# Patient Record
Sex: FEMALE | Race: BLACK OR AFRICAN AMERICAN
[De-identification: names, ages, dates, MRNs, and addresses within clinical notes are randomized per-mention and may not be internally consistent; named-entity substitution may affect disease eponyms.]

---

## 2016-07-28 VITALS — SYSTOLIC BLOOD PRESSURE: 153 MMHG | DIASTOLIC BLOOD PRESSURE: 93 MMHG

## 2017-02-13 ENCOUNTER — HOSPITAL ENCOUNTER (OUTPATIENT)
Dept: HOSPITAL 61 - LAB | Age: 42
Discharge: HOME | End: 2017-02-13
Attending: INTERNAL MEDICINE
Payer: COMMERCIAL

## 2017-02-13 DIAGNOSIS — J20.9: Primary | ICD-10-CM

## 2017-02-13 LAB — OBC FLU: (no result)

## 2017-02-13 PROCEDURE — 87804 INFLUENZA ASSAY W/OPTIC: CPT

## 2017-04-18 ENCOUNTER — HOSPITAL ENCOUNTER (OUTPATIENT)
Dept: HOSPITAL 61 - SURG | Age: 42
Discharge: HOME | End: 2017-04-18
Attending: INTERNAL MEDICINE
Payer: COMMERCIAL

## 2017-04-18 VITALS — SYSTOLIC BLOOD PRESSURE: 136 MMHG | DIASTOLIC BLOOD PRESSURE: 84 MMHG

## 2017-04-18 DIAGNOSIS — Z98.51: ICD-10-CM

## 2017-04-18 DIAGNOSIS — K22.4: Primary | ICD-10-CM

## 2017-04-18 DIAGNOSIS — D64.9: ICD-10-CM

## 2017-04-18 DIAGNOSIS — K21.9: ICD-10-CM

## 2017-04-18 LAB
NEG OBC UR: (no result)
POS OBC UR: (no result)

## 2017-04-18 PROCEDURE — 43248 EGD GUIDE WIRE INSERTION: CPT

## 2017-04-18 PROCEDURE — 81025 URINE PREGNANCY TEST: CPT

## 2017-04-18 NOTE — PDOC1
HISTORY & PHYSICAL


H&P





                  Outpatient history and physical examination


HPI:  


Patient has been having significant recurrent dysphagia to the point of choking 

sensation.  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  Benefited from dilation 

previously.      


Past medical history:  


Gastroesophageal reflux disease.


Systemic lupus erythematosus


Anemia   


Redding's esophagus not proven during last endoscopy as well as biopsy.  


Past surgical history:  


Tubal ligation


Appendectomy


Family history:  


Negative for any gastrointestinal or any hepatic diseases.    


Personal history:  


Non smoker. No alcohol abuse. No history of any recreational drug usage.     


Allergies:  


NKDA               


Current medications:  


Reviewed       


Review of systems:  


Unremarkable other then what has been described in HPI and past medical and 

surgical history.        


Physical examination:


GENERAL: Patient is alert and oriented.


VS: Stable .


HEENT: No cervical adenopathy. No scleral icterus. 


CHEST: clear to A&P


HEART: s1 and s2 are normal. No murmur.


ABDOMEN: Soft. Not distended. Non tender. No masses or organomegaly. No hernia. 

No ascites. BS present.


CNS: No neurological deficit noted.


LYMPHATICS: No evidence of peripheral lymphadenopathy noted.


Assessments:            


Dysphagia   


Plan:   


EGD and dilation.





         
































;








JESSICA OCHOA MD Jul 28, 2016 11:42








JESSICA OCHOA MD Apr 18, 2017 08:48

## 2017-04-18 NOTE — PDOC4
GI OP Report - Dr. Ochoa


Date/Time


DATE: 4/18/17 


TIME: 08:49


Attending Physician


Anam Ochoa MD


Referring Physician





Indications


Dysphagia


Pre-Op


See the Anesthesia note for documentation of the administered medications


Procedures


Upper GI endoscopy+dilation


Findings


- The esophageal examination was consistent with esophageal spasm.  Dilated. 


- Normal lower third of esophagus. 


- Normal stomach. 


- Normal examined duodenum. 


- No specimens collected.


Plan


- Discharge patient to home. 


- Patient has a contact number available for emergencies.  The signs and 

symptoms of potential delayed complications were discussed with the patient.  

Return to normal activities tomorrow.  Written discharge instructions were 

provided to the patient. 


- Resume regular diet. 


- Continue present medications. 


- Return to my office as needed.








ANAM OCHOA MD Apr 18, 2017 08:58

## 2017-11-30 ENCOUNTER — HOSPITAL ENCOUNTER (INPATIENT)
Dept: HOSPITAL 61 - ER | Age: 42
Discharge: HOME | DRG: 313 | End: 2017-11-30
Attending: INTERNAL MEDICINE | Admitting: INTERNAL MEDICINE
Payer: COMMERCIAL

## 2017-11-30 VITALS — DIASTOLIC BLOOD PRESSURE: 93 MMHG | SYSTOLIC BLOOD PRESSURE: 134 MMHG

## 2017-11-30 VITALS
DIASTOLIC BLOOD PRESSURE: 86 MMHG | DIASTOLIC BLOOD PRESSURE: 86 MMHG | SYSTOLIC BLOOD PRESSURE: 136 MMHG | SYSTOLIC BLOOD PRESSURE: 136 MMHG | SYSTOLIC BLOOD PRESSURE: 136 MMHG | DIASTOLIC BLOOD PRESSURE: 86 MMHG

## 2017-11-30 VITALS — HEIGHT: 62 IN | WEIGHT: 193.4 LBS | BODY MASS INDEX: 35.59 KG/M2

## 2017-11-30 VITALS — SYSTOLIC BLOOD PRESSURE: 145 MMHG | DIASTOLIC BLOOD PRESSURE: 87 MMHG

## 2017-11-30 DIAGNOSIS — Z79.82: ICD-10-CM

## 2017-11-30 DIAGNOSIS — D89.89: ICD-10-CM

## 2017-11-30 DIAGNOSIS — R07.89: Primary | ICD-10-CM

## 2017-11-30 DIAGNOSIS — K22.70: ICD-10-CM

## 2017-11-30 DIAGNOSIS — Z98.51: ICD-10-CM

## 2017-11-30 DIAGNOSIS — Z90.49: ICD-10-CM

## 2017-11-30 DIAGNOSIS — K21.9: ICD-10-CM

## 2017-11-30 DIAGNOSIS — I24.9: ICD-10-CM

## 2017-11-30 DIAGNOSIS — Z82.49: ICD-10-CM

## 2017-11-30 DIAGNOSIS — Z87.11: ICD-10-CM

## 2017-11-30 DIAGNOSIS — K22.4: ICD-10-CM

## 2017-11-30 DIAGNOSIS — E87.6: ICD-10-CM

## 2017-11-30 DIAGNOSIS — D64.9: ICD-10-CM

## 2017-11-30 DIAGNOSIS — I16.0: ICD-10-CM

## 2017-11-30 DIAGNOSIS — Z83.3: ICD-10-CM

## 2017-11-30 DIAGNOSIS — I10: ICD-10-CM

## 2017-11-30 LAB
% SAT IRON: 15 % (ref 15–34)
ALBUMIN SERPL-MCNC: 3.5 G/DL (ref 3.4–5)
ALBUMIN/GLOB SERPL: 0.7 {RATIO} (ref 1–1.7)
ALP SERPL-CCNC: 78 U/L (ref 46–116)
ALT SERPL-CCNC: 20 U/L (ref 14–59)
ANION GAP SERPL CALC-SCNC: 8 MMOL/L (ref 6–14)
ANION GAP SERPL CALC-SCNC: 8 MMOL/L (ref 6–14)
AST SERPL-CCNC: 17 U/L (ref 15–37)
BARBITURATES UR-MCNC: (no result) UG/ML
BASOPHILS # BLD AUTO: 0 X10^3/UL (ref 0–0.2)
BASOPHILS NFR BLD: 1 % (ref 0–3)
BENZODIAZ UR-MCNC: (no result) UG/L
BILIRUB SERPL-MCNC: 0.4 MG/DL (ref 0.2–1)
BUN SERPL-MCNC: 8 MG/DL (ref 7–20)
BUN SERPL-MCNC: 9 MG/DL (ref 7–20)
BUN/CREAT SERPL: 10 (ref 6–20)
CALCIUM SERPL-MCNC: 8.5 MG/DL (ref 8.5–10.1)
CALCIUM SERPL-MCNC: 8.7 MG/DL (ref 8.5–10.1)
CANNABINOIDS UR-MCNC: (no result) UG/L
CHLORIDE SERPL-SCNC: 106 MMOL/L (ref 98–107)
CHLORIDE SERPL-SCNC: 107 MMOL/L (ref 98–107)
CHOLEST SERPL-MCNC: 140 MG/DL (ref 0–200)
CHOLEST/HDLC SERPL: 2.7 {RATIO}
CO2 SERPL-SCNC: 28 MMOL/L (ref 21–32)
CO2 SERPL-SCNC: 28 MMOL/L (ref 21–32)
COCAINE UR-MCNC: (no result) NG/ML
CREAT SERPL-MCNC: 0.7 MG/DL (ref 0.6–1)
CREAT SERPL-MCNC: 0.8 MG/DL (ref 0.6–1)
EOSINOPHIL NFR BLD: 3 % (ref 0–3)
ERYTHROCYTE [DISTWIDTH] IN BLOOD BY AUTOMATED COUNT: 17.2 % (ref 11.5–14.5)
FOLATE SERPL-MCNC: 14.12 NG/ML (ref 3.2–20)
GFR SERPLBLD BASED ON 1.73 SQ M-ARVRAT: 111.6 ML/MIN
GFR SERPLBLD BASED ON 1.73 SQ M-ARVRAT: 95.6 ML/MIN
GLOBULIN SER-MCNC: 4.9 G/DL (ref 2.2–3.8)
GLUCOSE SERPL-MCNC: 93 MG/DL (ref 70–99)
GLUCOSE SERPL-MCNC: 98 MG/DL (ref 70–99)
HCT VFR BLD CALC: 32.3 % (ref 36–47)
HDLC SERPL-MCNC: 51 MG/DL (ref 40–60)
HGB BLD-MCNC: 10.2 G/DL (ref 12–15.5)
INR PPP: 1.1 (ref 0.8–1.1)
IRON SERPL-MCNC: 50 UG/DL (ref 50–170)
LYMPHOCYTES # BLD: 3.5 X10^3/UL (ref 1–4.8)
LYMPHOCYTES NFR BLD AUTO: 58 % (ref 24–48)
MCH RBC QN AUTO: 24 PG (ref 25–35)
MCHC RBC AUTO-ENTMCNC: 32 G/DL (ref 31–37)
MCV RBC AUTO: 77 FL (ref 79–100)
METHADONE SERPL-MCNC: (no result) NG/ML
MONOCYTES NFR BLD: 6 % (ref 0–9)
NEG OBC SER: (no result)
NEUTROPHILS NFR BLD AUTO: 33 % (ref 31–73)
NONHDLC SERPL-MCNC: 89 MG/DL (ref 0–129)
OPIATES UR-MCNC: (no result) NG/ML
PCP SERPL-MCNC: (no result) MG/DL
PLATELET # BLD AUTO: 358 X10^3/UL (ref 140–400)
POS OBC SER: (no result)
POTASSIUM SERPL-SCNC: 3.4 MMOL/L (ref 3.5–5.1)
POTASSIUM SERPL-SCNC: 3.7 MMOL/L (ref 3.5–5.1)
PROT SERPL-MCNC: 8.4 G/DL (ref 6.4–8.2)
PROTHROMBIN TIME: 13.1 SEC (ref 11.7–14)
RBC # BLD AUTO: 4.2 X10^6/UL (ref 3.5–5.4)
SODIUM SERPL-SCNC: 142 MMOL/L (ref 136–145)
SODIUM SERPL-SCNC: 143 MMOL/L (ref 136–145)
TRIGL SERPL-MCNC: 50 MG/DL (ref 0–150)
VITAMIN-B12: 449 PG/ML (ref 247–911)
WBC # BLD AUTO: 6 X10^3/UL (ref 4–11)

## 2017-11-30 PROCEDURE — 81025 URINE PREGNANCY TEST: CPT

## 2017-11-30 PROCEDURE — G0479 DRUG TEST PRESUMP NOT OPT: HCPCS

## 2017-11-30 PROCEDURE — 85610 PROTHROMBIN TIME: CPT

## 2017-11-30 PROCEDURE — 93005 ELECTROCARDIOGRAM TRACING: CPT

## 2017-11-30 PROCEDURE — A9500 TC99M SESTAMIBI: HCPCS

## 2017-11-30 PROCEDURE — 84484 ASSAY OF TROPONIN QUANT: CPT

## 2017-11-30 PROCEDURE — 96376 TX/PRO/DX INJ SAME DRUG ADON: CPT

## 2017-11-30 PROCEDURE — 82746 ASSAY OF FOLIC ACID SERUM: CPT

## 2017-11-30 PROCEDURE — 85025 COMPLETE CBC W/AUTO DIFF WBC: CPT

## 2017-11-30 PROCEDURE — 71010: CPT

## 2017-11-30 PROCEDURE — 82607 VITAMIN B-12: CPT

## 2017-11-30 PROCEDURE — 84703 CHORIONIC GONADOTROPIN ASSAY: CPT

## 2017-11-30 PROCEDURE — 84443 ASSAY THYROID STIM HORMONE: CPT

## 2017-11-30 PROCEDURE — 80048 BASIC METABOLIC PNL TOTAL CA: CPT

## 2017-11-30 PROCEDURE — 36415 COLL VENOUS BLD VENIPUNCTURE: CPT

## 2017-11-30 PROCEDURE — 78452 HT MUSCLE IMAGE SPECT MULT: CPT

## 2017-11-30 PROCEDURE — 80061 LIPID PANEL: CPT

## 2017-11-30 PROCEDURE — 93017 CV STRESS TEST TRACING ONLY: CPT

## 2017-11-30 PROCEDURE — S0028 INJECTION, FAMOTIDINE, 20 MG: HCPCS

## 2017-11-30 PROCEDURE — 96375 TX/PRO/DX INJ NEW DRUG ADDON: CPT

## 2017-11-30 PROCEDURE — 85520 HEPARIN ASSAY: CPT

## 2017-11-30 PROCEDURE — 80053 COMPREHEN METABOLIC PANEL: CPT

## 2017-11-30 PROCEDURE — 80307 DRUG TEST PRSMV CHEM ANLYZR: CPT

## 2017-11-30 PROCEDURE — 96374 THER/PROPH/DIAG INJ IV PUSH: CPT

## 2017-11-30 PROCEDURE — 70450 CT HEAD/BRAIN W/O DYE: CPT

## 2017-11-30 PROCEDURE — 83550 IRON BINDING TEST: CPT

## 2017-11-30 PROCEDURE — 83540 ASSAY OF IRON: CPT

## 2017-11-30 PROCEDURE — 83880 ASSAY OF NATRIURETIC PEPTIDE: CPT

## 2017-11-30 RX ADMIN — NITROGLYCERIN PRN MG: 0.4 TABLET SUBLINGUAL at 08:13

## 2017-11-30 RX ADMIN — NITROGLYCERIN PRN MG: 0.4 TABLET SUBLINGUAL at 08:19

## 2017-11-30 RX ADMIN — NITROGLYCERIN PRN MG: 0.4 TABLET SUBLINGUAL at 04:48

## 2017-11-30 NOTE — EKG
[facility]

              [address] [place] [address]

Test Date:    2017               Test Time:    05:33:17

Pat Name:     TUNG DIEHL             Department:   

Patient ID:   [id number]           Room:         250 1

Gender:       F                        Technician:   

:          1975               Requested By: GINGER WINSTON

Order Number: [id number]            Reading MD:     

                                 Measurements

Intervals                              Axis          

Rate:         65                       P:            28

FL:           160                      QRS:          -8

QRSD:         92                       T:            -31

QT:           376                                    

QTc:          396                                    

                           Interpretive Statements

SINUS RHYTHM

LEFTWARD AXIS

QRS(T) CONTOUR ABNORMALITY

CONSIDER ANTEROSEPTAL MYOCARDIAL DAMAGE

T ABNORMALITY IN ANTERIOR LEADS

INFEROLATERAL LEADS

ABNORMAL ECG

RI6.01

No previous ECG available for comparison

## 2017-11-30 NOTE — RAD
Portable chest, 11/30/2017:



History: Chest pain



Comparison is made to a study from 12/3/2012. The heart size and pulmonary

vascularity are normal. No pulmonary infiltrates are seen. There is no

evidence of pleural fluid.



IMPRESSION: No acute cardiopulmonary abnormality is detected.

## 2017-11-30 NOTE — RAD
CT HEAD WITHOUT CONTRAST



History: numb/tingling R forearm 



Comparison: CT head dated 9/11/2012, MRI brain dated 9/12/2012.



Procedure: Axial images are obtained of the head from the skull base through

the vertex without IV contrast.



Findings: 



Gray-white matter differentiation is preserved. The ventricles and sulci are

normal for the patient's age.. No mass-effect, midline shift, hemorrhage or

obvious acute infarction is identified.  Basilar cisterns are patent.  



Bone windows demonstrate no significant calvarial abnormality.The visualized

paranasal sinuses appear clear. Mastoid air cells are well aerated. 



IMPRESSION:

No acute intracranial abnormality.     













PQRS Compliance Statement:



One or more of the following individualized dose reduction techniques were

utilized for this examination:

1. Automated exposure control

2. Adjustment of the mA and/or kV according to patient size

3. Use of iterative reconstruction technique

## 2017-11-30 NOTE — PDOC2
HARSHAD VARNER APRN 11/30/17 0931:


CARDIAC CONSULT


DATE OF CONSULT


Date of Consult


DATE: 11/30/17 


TIME: 09:28





REASON FOR CONSULT


Reason for Consult:


Chest pain


Abnormal EKG





REFERRING PHYSICIAN


Referring Physician:


Dr. Russell





SOURCE


Source:  Chart review, Patient





HISTORY OF PRESENT ILLNESS


HISTORY OF PRESENT ILLNESS


This is a 41 yo female who presented with complaints of chest pain. Patient 

reports pain awoke her out of sleep about 2am this morning. Located in her left 

chest. Describes as dull ache. Has been intermittent. No precipitating factors. 

Associated with mild SOA. No dizziness, diaphoresis, palpitations, and nausea/

vomiting. Improved with deep breath. Also woke up with redness, tingling, and 

heaviness of her right arm, which has since resolved. No previous history of 

cardiac disease.  EKG with t-wave inversion of anteroseptal and lateral leads, 

although unchanged from previous study in 2012. Troponin negative x2.





PAST MEDICAL HISTORY


Cardiovascular:  No pertinent hx


Pulmonary:  No pertinent hx


GI:  GERD, Other (Redding's Esophagus, esophageal spasms.  multiple esophageal 

dilations. )


Heme/Onc:  No pertinent hx


Hepatobiliary:  No pertinent hx


Psych:  No pertinent hx


Rheumatologic:  No pertinent hx


Infectious disease:  No pertinent hx


ENT:  No pertinent hx


Renal/:  No pertinent hx


Endocrine:  No pertinent hx


Dermatology:  No pertinent hx





PAST SURGICAL HISTORY


Past Surgical History:  No pertinent history





FAMILY HISTORY


Family History:  Coronary Artery Disease (father), Hypertension





SOCIAL HISTORY


Smoke:  No


ALCOHOL:  none


Drugs:  None


Lives:  with Family





CURRENT MEDICATIONS


CURRENT MEDICATIONS





Current Medications








 Medications


  (Trade)  Dose


 Ordered  Sig/En


 Route


 PRN Reason  Start Time


 Stop Time Status Last Admin


Dose Admin


 


 Nitroglycerin


  (Nitrostat)  0.4 mg  PRN Q5MIN  PRN


 SL


 CHEST PAIN  11/30/17 04:30


    11/30/17 08:19


 


 


 Aspirin


  (Children[facility]S


 Aspirin)  324 mg  1X  ONCE


 PO


   11/30/17 04:30


 11/30/17 04:33 DC 11/30/17 04:48


 


 


 Fentanyl Citrate


  (Fentanyl 2ml


 Vial)  50 mcg  PRN Q2HR  PRN


 IV


 PAIN  11/30/17 04:45


 11/30/17 08:31 DC 11/30/17 05:13


 


 


 Heparin Sodium/


 Dextrose  500 ml @ 0


 mls/hr  CONT  PRN


 IV


 SEE I/O RECORD  11/30/17 05:45


    11/30/17 05:39


 


 


 Heparin Sodium


  (Porcine)


  (Heparin Sodium)  2,150 unit  PRN Q6HRS  PRN


 IV


 FOR UFH LEVEL LESS THAN 0.2  11/30/17 05:45


    11/30/17 05:37


 


 


 Fentanyl Citrate


  (Fentanyl 2ml


 Vial)  50 mcg  PRN Q2HR  PRN


 IV


 PAIN  11/30/17 08:30


    11/30/17 08:40


 











ALLERGIES


ALLERGIES:  


Coded Allergies:  


     No Known Drug Allergies (Unverified , 4/18/17)





ROS


Review of System


14 point ROS conducted with pertinent positives noted above in HPI.





PHYSICAL EXAM


General:  Alert, Oriented X3, Cooperative, No acute distress


HEENT:  Atraumatic, Mucous membr. moist/pink


Lungs:  Clear to auscultation, Normal air movement


Heart:  Regular rate, Normal S1, Normal S2, No murmurs


Abdomen:  Soft, No tenderness


Extremities:  No edema, Normal pulses


Skin:  No significant lesion


Neuro:  Normal speech, Sensation intact


Psych/Mental Status:  Mental status NL, Mood NL


MUSCULOSKELETAL:  No joint tenderness





VITALS


VITALS





Vital Signs








  Date Time  Temp Pulse Resp B/P (MAP) Pulse Ox O2 Delivery O2 Flow Rate FiO2


 


11/30/17 08:40   18  99 Room Air  


 


11/30/17 08:19    129/77    


 


11/30/17 07:00 98.1 72      





 98.1       











LABS


Lab:





Laboratory Tests








Test


  11/30/17


03:50 11/30/17


04:20 11/30/17


04:27 11/30/17


08:30


 


White Blood Count


  6.0 x10^3/uL


(4.0-11.0) 


  


  


 


 


Red Blood Count


  4.20 x10^6/uL


(3.50-5.40) 


  


  


 


 


Hemoglobin


  10.2 g/dL


(12.0-15.5) 


  


  


 


 


Hematocrit


  32.3 %


(36.0-47.0) 


  


  


 


 


Mean Corpuscular Volume 77 fL ()    


 


Mean Corpuscular Hemoglobin 24 pg (25-35)    


 


Mean Corpuscular Hemoglobin


Concent 32 g/dL


(31-37) 


  


  


 


 


Red Cell Distribution Width


  17.2 %


(11.5-14.5) 


  


  


 


 


Platelet Count


  358 x10^3/uL


(140-400) 


  


  


 


 


Neutrophils (%) (Auto) 33 % (31-73)    


 


Lymphocytes (%) (Auto) 58 % (24-48)    


 


Monocytes (%) (Auto) 6 % (0-9)    


 


Eosinophils (%) (Auto) 3 % (0-3)    


 


Basophils (%) (Auto) 1 % (0-3)    


 


Neutrophils # (Auto)


  2.0 x10^3uL


(1.8-7.7) 


  


  


 


 


Lymphocytes # (Auto)


  3.5 x10^3/uL


(1.0-4.8) 


  


  


 


 


Monocytes # (Auto)


  0.4 x10^3/uL


(0.0-1.1) 


  


  


 


 


Eosinophils # (Auto)


  0.2 x10^3/uL


(0.0-0.7) 


  


  


 


 


Basophils # (Auto)


  0.0 x10^3/uL


(0.0-0.2) 


  


  


 


 


Sodium Level


  142 mmol/L


(136-145) 


  


  143 mmol/L


(136-145)


 


Potassium Level


  3.4 mmol/L


(3.5-5.1) 


  


  3.7 mmol/L


(3.5-5.1)


 


Chloride Level


  106 mmol/L


() 


  


  107 mmol/L


()


 


Carbon Dioxide Level


  28 mmol/L


(21-32) 


  


  28 mmol/L


(21-32)


 


Anion Gap 8 (6-14)    8 (6-14) 


 


Blood Urea Nitrogen 8 mg/dL (7-20)    9 mg/dL (7-20) 


 


Creatinine


  0.8 mg/dL


(0.6-1.0) 


  


  0.7 mg/dL


(0.6-1.0)


 


Estimated GFR


(Cockcroft-Gault) 95.6 


  


  


  111.6 


 


 


BUN/Creatinine Ratio 10 (6-20)    


 


Glucose Level


  98 mg/dL


(70-99) 


  


  93 mg/dL


(70-99)


 


Calcium Level


  8.7 mg/dL


(8.5-10.1) 


  


  8.5 mg/dL


(8.5-10.1)


 


Total Bilirubin


  0.4 mg/dL


(0.2-1.0) 


  


  


 


 


Aspartate Amino Transf


(AST/SGOT) 17 U/L (15-37) 


  


  


  


 


 


Alanine Aminotransferase


(ALT/SGPT) 20 U/L (14-59) 


  


  


  


 


 


Alkaline Phosphatase


  78 U/L


() 


  


  


 


 


Troponin I Quantitative


  < 0.017 ng/mL


(0.000-0.055) 


  


  < 0.017 ng/mL


(0.000-0.055)


 


NT-Pro-B-Type Natriuretic


Peptide 108 pg/mL


(0-124) 


  


  


 


 


Total Protein


  8.4 g/dL


(6.4-8.2) 


  


  


 


 


Albumin


  3.5 g/dL


(3.4-5.0) 


  


  


 


 


Albumin/Globulin Ratio 0.7 (1.0-1.7)    


 


Serum Pregnancy Test,


Qualitative Negative (NEG) 


  


  


  


 


 


Urine Opiates Screen  Neg (NEG)   


 


Urine Methadone Screen  Neg (NEG)   


 


Urine Barbiturates  Neg (NEG)   


 


Urine Phencyclidine Screen  Neg (NEG)   


 


Urine


Amphetamine/Methamphetamine 


  Neg (NEG) 


  


  


 


 


Urine Benzodiazepines Screen  Neg (NEG)   


 


Urine Cocaine Screen  Neg (NEG)   


 


Urine Cannabinoids Screen  Neg (NEG)   


 


Urine Ethyl Alcohol  Neg (NEG)   


 


Bedside Urine HCG, Qualitative


  


  


  Hcg negative


(Negative) 


 


 


Triglycerides Level


  


  


  


  50 mg/dL


(0-150)


 


Cholesterol Level


  


  


  


  140 mg/dL


(0-200)


 


LDL Cholesterol, Calculated


  


  


  


  79 mg/dL


(0-100)


 


VLDL Cholesterol, Calculated


  


  


  


  10 mg/dL


(0-40)


 


Non-HDL Cholesterol Calculated


  


  


  


  89 mg/dL


(0-129)


 


HDL Cholesterol


  


  


  


  51 mg/dL


(40-60)


 


Cholesterol/HDL Ratio    2.7 











ASSESSMENT/PLAN


ASSESSMENT/PLAN


1.  Chest pain, atypical. AMI ruled out.


2.  Abnormal EKG


3.  Accelerated hypertension


4.  GERD/ Redding's esophagus/ esophageal spasms











Recommendations





Discontinue heparin gtt


Given abnormal EKG and risk factors, will proceed with MPI to r/o ischemia.


Problems:  





RIMMA MEADE MD 11/30/17 1722:


CARDIAC CONSULT


ALLERGIES


ALLERGIES:  


Coded Allergies:  


     No Known Drug Allergies (Unverified , 4/18/17)





ASSESSMENT/PLAN


ASSESSMENT/PLAN


Patient seen and examined. Agree with above nurse practitioner note.


Noncardiac chest pain. My cardio perfusion imaging normal. EKG abnormalities 

were present in 2012. Negative biomarkers.


Patient has a possible history of autoimmune disease, consider further 

evaluation on an outpatient basis.


Problems:  











HARSHAD VARNER Nov 30, 2017 09:31


RIMMA MEADE MD Nov 30, 2017 17:22

## 2017-11-30 NOTE — RAD
--------------- APPROVED REPORT --------------





Test Type:          Pharmacological

Stress Nurse/Tech: sherrell diaz

Test Indications: CHEST PAIN

Cardiac History: NONE STATED, SEE EHR

Medications:     SEE EHR

Medical History: NONE STATED, SEE EHR

Resting ECG:     SR WITH T WAVE INVERSION

Resting Heart Rate: 71 bpm

Resting Blood Pressure: 122/78mmHg

Pretest Chest Pain: No chest pain



Nurse/Tech Notes

NO RESPIRATORY DISTRESS AND NO CHEST PAIN AT THIS TIME.

Consent: The procedure was explained to the patient in lay terms. Informed consent was witnessed. Sunny
eout was entered into [employer]. History and Stress Test performed by MARIA LUISA Ramires, STAN (R) 
(N)



Pharm. Details

Pharmacologic stress testing was performed using 0.4mg per 5ml of regadenoson given intravenously ove
r 7-10 seconds.



Stress Symptoms

BODY FATIGUE, STOMACH, AND HEADACHE.



POST EXERCISE

Reason for Termination: Infusion complete

Max HR: 116 bpm

Max Blood Pressure: 126/82mmHg

Chest Pain: No. 

Arrhythmia: No. 

ST Change: No. 



INTERPRETATION

Stress EKG Conclusion: Baseline EKG showed sinus rhythm.  No ischemic changes at peak stress.  No arr
hythmias.



Imaging Protocol

IMAGE PROTOCOL: Rest Tc-99m/stress Tc-99m 1 day



Rest:            Stress:         Viability:   

Radiopharm.Tc99m [id number] Sestamibi

Dose9.5mCi            37mCi            

Duration    15min.           10min.           

Img Date  11/30/2017 11/30/2017      

Inj-Img [id number].           60min.           



Rest Admin Site:IV - Left AntecubitalAdministrator:RT Leann (R)(N)

Stress Admin Site: IV - Left AntecubitalAdministrator: MARIA LUISA Ramires, STAN (R)(N)



STRESS DATA

End Diast. Vol.97.0mlAv. Heart Rate74.0bpm

End Syst. Vol.28.0mlCO Index BSA0.0L/min

Myocardial Snhe352.0gEject. Pbgswnfk63.0%



Stress Rates

Pk. Fill Rate2.98EDV/secLVtime Pk. Fill 111.52msec

Pk. Empty Rate4.26ESV/secLVtime Pk. Gghuy463.46msec

1/3 Pk. Fill2.07EDV/sec



Stress Scores

Regional WT0.00Summed WT1.00

Regional WM0.00Summed WM0.00



Study quality was good.  Left Ventricular size was Normal at Rest and Stress.

Lung uptake was Normal.  Left Ventricular ejection fraction is 71%.

The rest and stress images show normal perfusion, normal contraction and thickening.



LV Perf. Quant

17 Seg. SSS0.00

17 Seg. SRS0.00

17 Seg. SDS0.00

Stress Defect Extent (% LAD)0.00Rest Defect Extent (% LAD)0.60Rev. Defect Extent (% LAD)0.00

Stress Defect Extent (% LCX) 0.00Rest Defect Extent (% LCX)0.00Rev. Defect Extent (% LCX)0.00

Stress Defect Extent (% RCA)0.00Rest Defect Extent (% RCA)0.00Rev. Defect Extent (% RCA)0.00

Stress Defect Extent (% SEE)0.00Rest Defect Extent (% SEE)0.70Rev. Defect Extent (% SEE)0.00



Conclusion

1. Regadenoson cardioisotope stress test did not show any evidence of ischemia or infarct.

2. Normal left ventricular systolic function with ejection fraction calculated at 71%.

3. Low risk for cardiac events.

## 2017-11-30 NOTE — PDOC1
CHARLETTECaraHOLLI ACKERMAN APRN 11/30/17 0857:


HISTORY AND PHYSICAL


Chief Complaint


Chief Complaint


This  42 year old male has been admitted with a chief complaint of chest pain. 

She was awakened with L sided anterior chest pain, palpitations and dyspnea 

this morning at 2am and concurrent  L elbow pain/tingling radiating to  hand. 

Diaphoresis and nausea were negative. She presented to the ED and her BP was 

noted to be 178/101. ASA 324mg was given along with NTG sublingually which did 

not relieve the pain. Fentanyl 50mcg IV was given and some pain relief was 

obtained. An EKG was SR with no  acute changes and Troponin was negative. She 

has a h/o GERD, Redding's esophagus and gastric ulcers x 2. Last EGD was 04/07. 

She also has an autoimmune disease with lupus antigen negative. CXR was negative

, She is admitted to [facility] and is on an Heparin gtt. The pain began in her L 

elbow again this morning with no relief after 2 NTG sublingually. Cardiology 

has been consulted.


Problem List


Problems


Medical Problems:


(1) Acute coronary syndrome


Status: Acute  











Past Medical History


GI:  GERD, Other (Barretts esophagus/ esophagel stenosis with stricture 

dilititation 04/2017 RU sphincter area)


Rheumatologic:  Other (autoimmune disorder )





Past Surgical History


PSH


bilateral oophorectomy


Past Surgical History:  Appendectomy





Past Family History


Family History:  Cancer (PGM uterine MGM unknown ), Coronary Artery Disease (

Father MI 60 yo stent placed ), Diabetes (PGFM), High Cholestrol (Mom Dad PGM )

, Hypertension (MOm DAd PGM Brother )





Past Social History


PSH


no h/o tobacco, ETOH or illicit drug use





Review of Symptoms


Review of Symptoms


A 14 point ROS was completed with the following noted as positive: per HPI 





Other systems reviewed and negative.


Medications





Medications reviewed and reconciled


Allergy





 Allergies








Coded Allergies Type Severity Reaction Last Updated Verified


 


  No Known Drug Allergies    4/18/17 No











Physical Exam


Physical Exam


General appearance - alert, ill appearing, and in no distress 


Mental Status - alert, oriented to person, place, and time, affect appropriate 

to mood


Head - normal


Chest - clear to auscultation, no wheezes, rales or rhonchi, symmetric air entry


Heart - S1 and S2 normal


Abdomen - soft, nontender, nondistended, no masses or organomegaly, BS +


Neurological - no acute focal neurological deficit noted. 


Musculoskeletal - no muscular tenderness noted


Extremities - no pedal edema


Skin - warm and dry





VTE Prophylaxis Ordered


VTE Prophylaxis Devices:  No


VTE Pharmacological Prophylaxi:  Yes (Heparin infusion )





Assessment


Labs





Laboratory Tests








Test


  11/30/17


03:50 11/30/17


04:20 11/30/17


04:27


 


White Blood Count


  6.0 x10^3/uL


(4.0-11.0) 


  


 


 


Red Blood Count


  4.20 x10^6/uL


(3.50-5.40) 


  


 


 


Hemoglobin


  10.2 g/dL


(12.0-15.5) 


  


 


 


Hematocrit


  32.3 %


(36.0-47.0) 


  


 


 


Mean Corpuscular Volume 77 fL ()   


 


Mean Corpuscular Hemoglobin 24 pg (25-35)   


 


Mean Corpuscular Hemoglobin


Concent 32 g/dL


(31-37) 


  


 


 


Red Cell Distribution Width


  17.2 %


(11.5-14.5) 


  


 


 


Platelet Count


  358 x10^3/uL


(140-400) 


  


 


 


Neutrophils (%) (Auto) 33 % (31-73)   


 


Lymphocytes (%) (Auto) 58 % (24-48)   


 


Monocytes (%) (Auto) 6 % (0-9)   


 


Eosinophils (%) (Auto) 3 % (0-3)   


 


Basophils (%) (Auto) 1 % (0-3)   


 


Neutrophils # (Auto)


  2.0 x10^3uL


(1.8-7.7) 


  


 


 


Lymphocytes # (Auto)


  3.5 x10^3/uL


(1.0-4.8) 


  


 


 


Monocytes # (Auto)


  0.4 x10^3/uL


(0.0-1.1) 


  


 


 


Eosinophils # (Auto)


  0.2 x10^3/uL


(0.0-0.7) 


  


 


 


Basophils # (Auto)


  0.0 x10^3/uL


(0.0-0.2) 


  


 


 


Sodium Level


  142 mmol/L


(136-145) 


  


 


 


Potassium Level


  3.4 mmol/L


(3.5-5.1) 


  


 


 


Chloride Level


  106 mmol/L


() 


  


 


 


Carbon Dioxide Level


  28 mmol/L


(21-32) 


  


 


 


Anion Gap 8 (6-14)   


 


Blood Urea Nitrogen 8 mg/dL (7-20)   


 


Creatinine


  0.8 mg/dL


(0.6-1.0) 


  


 


 


Estimated GFR


(Cockcroft-Gault) 95.6 


  


  


 


 


BUN/Creatinine Ratio 10 (6-20)   


 


Glucose Level


  98 mg/dL


(70-99) 


  


 


 


Calcium Level


  8.7 mg/dL


(8.5-10.1) 


  


 


 


Total Bilirubin


  0.4 mg/dL


(0.2-1.0) 


  


 


 


Aspartate Amino Transf


(AST/SGOT) 17 U/L (15-37) 


  


  


 


 


Alanine Aminotransferase


(ALT/SGPT) 20 U/L (14-59) 


  


  


 


 


Alkaline Phosphatase


  78 U/L


() 


  


 


 


Troponin I Quantitative


  < 0.017 ng/mL


(0.000-0.055) 


  


 


 


NT-Pro-B-Type Natriuretic


Peptide 108 pg/mL


(0-124) 


  


 


 


Total Protein


  8.4 g/dL


(6.4-8.2) 


  


 


 


Albumin


  3.5 g/dL


(3.4-5.0) 


  


 


 


Albumin/Globulin Ratio 0.7 (1.0-1.7)   


 


Serum Pregnancy Test,


Qualitative Negative (NEG) 


  


  


 


 


Urine Opiates Screen  Neg (NEG)  


 


Urine Methadone Screen  Neg (NEG)  


 


Urine Barbiturates  Neg (NEG)  


 


Urine Phencyclidine Screen  Neg (NEG)  


 


Urine


Amphetamine/Methamphetamine 


  Neg (NEG) 


  


 


 


Urine Benzodiazepines Screen  Neg (NEG)  


 


Urine Cocaine Screen  Neg (NEG)  


 


Urine Cannabinoids Screen  Neg (NEG)  


 


Urine Ethyl Alcohol  Neg (NEG)  


 


Bedside Urine HCG, Qualitative


  


  


  Hcg negative


(Negative)








Laboratory Tests








Test


  11/30/17


03:50 11/30/17


04:20 11/30/17


04:27


 


White Blood Count


  6.0 x10^3/uL


(4.0-11.0) 


  


 


 


Red Blood Count


  4.20 x10^6/uL


(3.50-5.40) 


  


 


 


Hemoglobin


  10.2 g/dL


(12.0-15.5) 


  


 


 


Hematocrit


  32.3 %


(36.0-47.0) 


  


 


 


Mean Corpuscular Volume 77 fL ()   


 


Mean Corpuscular Hemoglobin 24 pg (25-35)   


 


Mean Corpuscular Hemoglobin


Concent 32 g/dL


(31-37) 


  


 


 


Red Cell Distribution Width


  17.2 %


(11.5-14.5) 


  


 


 


Platelet Count


  358 x10^3/uL


(140-400) 


  


 


 


Neutrophils (%) (Auto) 33 % (31-73)   


 


Lymphocytes (%) (Auto) 58 % (24-48)   


 


Monocytes (%) (Auto) 6 % (0-9)   


 


Eosinophils (%) (Auto) 3 % (0-3)   


 


Basophils (%) (Auto) 1 % (0-3)   


 


Neutrophils # (Auto)


  2.0 x10^3uL


(1.8-7.7) 


  


 


 


Lymphocytes # (Auto)


  3.5 x10^3/uL


(1.0-4.8) 


  


 


 


Monocytes # (Auto)


  0.4 x10^3/uL


(0.0-1.1) 


  


 


 


Eosinophils # (Auto)


  0.2 x10^3/uL


(0.0-0.7) 


  


 


 


Basophils # (Auto)


  0.0 x10^3/uL


(0.0-0.2) 


  


 


 


Sodium Level


  142 mmol/L


(136-145) 


  


 


 


Potassium Level


  3.4 mmol/L


(3.5-5.1) 


  


 


 


Chloride Level


  106 mmol/L


() 


  


 


 


Carbon Dioxide Level


  28 mmol/L


(21-32) 


  


 


 


Anion Gap 8 (6-14)   


 


Blood Urea Nitrogen 8 mg/dL (7-20)   


 


Creatinine


  0.8 mg/dL


(0.6-1.0) 


  


 


 


Estimated GFR


(Cockcroft-Gault) 95.6 


  


  


 


 


BUN/Creatinine Ratio 10 (6-20)   


 


Glucose Level


  98 mg/dL


(70-99) 


  


 


 


Calcium Level


  8.7 mg/dL


(8.5-10.1) 


  


 


 


Total Bilirubin


  0.4 mg/dL


(0.2-1.0) 


  


 


 


Aspartate Amino Transf


(AST/SGOT) 17 U/L (15-37) 


  


  


 


 


Alanine Aminotransferase


(ALT/SGPT) 20 U/L (14-59) 


  


  


 


 


Alkaline Phosphatase


  78 U/L


() 


  


 


 


Troponin I Quantitative


  < 0.017 ng/mL


(0.000-0.055) 


  


 


 


NT-Pro-B-Type Natriuretic


Peptide 108 pg/mL


(0-124) 


  


 


 


Total Protein


  8.4 g/dL


(6.4-8.2) 


  


 


 


Albumin


  3.5 g/dL


(3.4-5.0) 


  


 


 


Albumin/Globulin Ratio 0.7 (1.0-1.7)   


 


Serum Pregnancy Test,


Qualitative Negative (NEG) 


  


  


 


 


Urine Opiates Screen  Neg (NEG)  


 


Urine Methadone Screen  Neg (NEG)  


 


Urine Barbiturates  Neg (NEG)  


 


Urine Phencyclidine Screen  Neg (NEG)  


 


Urine


Amphetamine/Methamphetamine 


  Neg (NEG) 


  


 


 


Urine Benzodiazepines Screen  Neg (NEG)  


 


Urine Cocaine Screen  Neg (NEG)  


 


Urine Cannabinoids Screen  Neg (NEG)  


 


Urine Ethyl Alcohol  Neg (NEG)  


 


Bedside Urine HCG, Qualitative


  


  


  Hcg negative


(Negative)











Plan


Plan


1. chest pain with palpitation


2. hypertensive urgency


3. hypokalemia 


4. anemia CD


5. GERD


6. Redding's esophagus 


7 autoimmune disorder 


8. gastric ulcers x 2 





PLAN:


11/30/17


chest pain - Troponin negative - NTG x 2 not relieved this morning - Fentanyl 

given and mild relief - EKG SR - K 3.4 - cardiology consulted - ASA 324mg ED - 

possible CC today - lipids pending


hypertensive urgency - r/t chest pain - improved with pain control - 


hypokalemia - K 3.4 - recheck this morning


anemia - Admit Hmg  10.2 - check Fe B12 Folate levels - monitor


numbness paresthesia FA - malignant HTN - neg neck/thoracic pain/tenderness - 

check CT head w/o contrast 








For more details regarding further plans, please refer to the orders.





LISY OCHOA MD 11/30/17 1015:


HISTORY AND PHYSICAL


Plan


Plan


Patient has chest pain radiating to rt upper extremity with numbness from elbow 

to hand.No weakness.CNS exam unremarkable.


Peripheral pulse ok.No carotid bruit.


No GI symptoms.


Cardiac workup.


The patient was seen and examined by me. Chart reviewed and plan of care 

formulated. Discussed with, reviewed and agree with ARNP's notes, plan of care 

and orders with modifications as necessary.


For more details regarding further plans, please refer to the orders.











HOLLI GUTIÉRREZ Nov 30, 2017 08:57


LISY OCHOA MD Nov 30, 2017 10:15

## 2017-11-30 NOTE — PHYS DOC
Past Medical History


Past Medical History:  Other


Additional Past Medical Histor:  UNDIAGNOSED AUTOIMMUNE DISORDER


Past Surgical History:  Appendectomy, Tubal ligation


Alcohol Use:  Occasionally


Drug Use:  None





Adult General


Chief Complaint


Chief Complaint:  CHEST PAIN





HPI


HPI





Patient is a 42  year old female who presents with left-sided chest pain 

radiating to right arm awaking her from sleep approximately 1 hour prior to ED 

arrival. Patient reports mild nausea shortness of breath. Pain is described as 

dull and aching rated moderate to severe. It is currently improved without 

treatment. Denies sweats. No leg pain or swelling. No history of DVT or PE. No 

abdominal pain. No fever chills cough. No other acute symptoms or complaints. 

Does not take medications. She has nonsmoker. Denies family history of coronary 

disease below age 50. []





Review of Systems


Review of Systems


ROS as per HPI. []





All other systems were reviewed and found to be within normal limits, except as 

documented in this note.





Current Medications


Current Medications





Current Medications








 Medications


  (Trade)  Dose


 Ordered  Sig/En  Start Time


 Stop Time Status Last Admin


Dose Admin


 


 Aspirin


  ([facility]


 Aspirin)  324 mg  1X  ONCE  11/30/17 04:30


 11/30/17 04:33 DC 11/30/17 04:48


324 MG


 


 Fentanyl Citrate


  (Fentanyl 2ml


 Vial)  50 mcg  PRN Q2HR  PRN  11/30/17 04:45


 12/1/17 04:44  11/30/17 05:13


50 MCG


 


 Nitroglycerin


  (Nitrostat)  0.4 mg  PRN Q5MIN  PRN  11/30/17 04:30


    11/30/17 04:48


0.4 MG


 


 Ondansetron HCl


  (Zofran)  4 mg  1X  ONCE  11/30/17 04:45


 11/30/17 04:51 DC  


 











Allergies


Allergies





Allergies








Coded Allergies Type Severity Reaction Last Updated Verified


 


  No Known Drug Allergies    4/18/17 No











Physical Exam


Physical Exam





Constitutional: Well developed, well nourished, no acute distress, non-toxic 

appearance. []


HENT: Normocephalic, atraumatic, bilateral external ears normal, oropharynx 

moist, no oral exudates, nose normal. []


Eyes: PERRLA, EOMI, conjunctiva normal, no discharge. [] 


Neck: Normal range of motion, no tenderness, supple, no stridor. [] 


Cardiovascular:Heart rate regular rhythm, no murmur. Negative Homans sign. []


Lungs & Thorax:  Bilateral breath sounds clear to auscultation []


Abdomen: Bowel sounds normal, soft, no tenderness. [] 


Skin: Warm, dry, no erythema, no rash. [] 


Back: No tenderness, no CVA tenderness. [] 


Extremities: No tenderness, no edema. [] 


Neurologic: Alert and oriented X 3, normal motor function, normal sensory 

function, no focal deficits noted. []


Psychologic: Affect normal, judgement normal, mood normal. []





Current Patient Data


Vital Signs





 Vital Signs








  Date Time  Temp Pulse Resp B/P (MAP) Pulse Ox O2 Delivery O2 Flow Rate FiO2


 


11/30/17 04:48  72  139/90    


 


11/30/17 04:30   20  99 Room Air  


 


11/30/17 03:45 98.6       





 98.6       








Lab Values





 Laboratory Tests








Test


  11/30/17


03:50 11/30/17


04:20 11/30/17


04:27


 


White Blood Count


  6.0 x10^3/uL


(4.0-11.0) 


  


 


 


Red Blood Count


  4.20 x10^6/uL


(3.50-5.40) 


  


 


 


Hemoglobin


  10.2 g/dL


(12.0-15.5)  L 


  


 


 


Hematocrit


  32.3 %


(36.0-47.0)  L 


  


 


 


Mean Corpuscular Volume


  77 fL ()


L 


  


 


 


Mean Corpuscular Hemoglobin


  24 pg (25-35)


L 


  


 


 


Mean Corpuscular Hemoglobin


Concent 32 g/dL


(31-37) 


  


 


 


Red Cell Distribution Width


  17.2 %


(11.5-14.5)  H 


  


 


 


Platelet Count


  358 x10^3/uL


(140-400) 


  


 


 


Neutrophils (%) (Auto) 33 % (31-73)    


 


Lymphocytes (%) (Auto) 58 % (24-48)  H  


 


Monocytes (%) (Auto) 6 % (0-9)    


 


Eosinophils (%) (Auto) 3 % (0-3)    


 


Basophils (%) (Auto) 1 % (0-3)    


 


Neutrophils # (Auto)


  2.0 x10^3uL


(1.8-7.7) 


  


 


 


Lymphocytes # (Auto)


  3.5 x10^3/uL


(1.0-4.8) 


  


 


 


Monocytes # (Auto)


  0.4 x10^3/uL


(0.0-1.1) 


  


 


 


Eosinophils # (Auto)


  0.2 x10^3/uL


(0.0-0.7) 


  


 


 


Basophils # (Auto)


  0.0 x10^3/uL


(0.0-0.2) 


  


 


 


Sodium Level


  142 mmol/L


(136-145) 


  


 


 


Potassium Level


  3.4 mmol/L


(3.5-5.1)  L 


  


 


 


Chloride Level


  106 mmol/L


() 


  


 


 


Carbon Dioxide Level


  28 mmol/L


(21-32) 


  


 


 


Anion Gap 8 (6-14)    


 


Blood Urea Nitrogen


  8 mg/dL (7-20)


  


  


 


 


Creatinine


  0.8 mg/dL


(0.6-1.0) 


  


 


 


Estimated GFR


(Cockcroft-Gault) 95.6  


  


  


 


 


BUN/Creatinine Ratio 10 (6-20)    


 


Glucose Level


  98 mg/dL


(70-99) 


  


 


 


Calcium Level


  8.7 mg/dL


(8.5-10.1) 


  


 


 


Total Bilirubin


  0.4 mg/dL


(0.2-1.0) 


  


 


 


Aspartate Amino Transferase


(AST) 17 U/L (15-37)


  


  


 


 


Alanine Aminotransferase (ALT)


  20 U/L (14-59)


  


  


 


 


Alkaline Phosphatase


  78 U/L


() 


  


 


 


Troponin I Quantitative


  < 0.017 ng/mL


(0.000-0.055) 


  


 


 


NT-Pro-B-Type Natriuretic


Peptide 108 pg/mL


(0-124) 


  


 


 


Total Protein


  8.4 g/dL


(6.4-8.2)  H 


  


 


 


Albumin


  3.5 g/dL


(3.4-5.0) 


  


 


 


Albumin/Globulin Ratio


  0.7 (1.0-1.7)


L 


  


 


 


Serum Pregnancy Test,


Qualitative Negative (NEG)


  


  


 


 


Urine Opiates Screen  Neg (NEG)   


 


Urine Methadone Screen  Neg (NEG)   


 


Urine Barbiturates  Neg (NEG)   


 


Urine Phencyclidine Screen  Neg (NEG)   


 


Urine


Amphetamine/Methamphetamine 


  Neg (NEG)  


  


 


 


Urine Benzodiazepines Screen  Neg (NEG)   


 


Urine Cocaine Screen  Neg (NEG)   


 


Urine Cannabinoids Screen  Neg (NEG)   


 


Urine Ethyl Alcohol  Neg (NEG)   


 


POC Urine HCG, Qualitative


  


  


  Hcg negative


(Negative)





 Laboratory Tests


11/30/17 03:50








 Laboratory Tests


11/30/17 03:50














EKG


EKG


[EKG: Normal sinus rhythm, rate 75, T-wave inversions in anterior leads. No 

acute ST elevation. . 





Repeat EKG: Unchanged]





Radiology/Procedures


Radiology/Procedures


[Chest x-ray: No acute cardiopulmonary disease on preliminary read..]





Course & Med Decision Making


Course & Med Decision Making


Pertinent Labs and Imaging studies reviewed. (See chart for details)





[EKG and chest pain concerning for acute coronary syndrome. Nitroglycerin given 

w/o improvt.. Troponin neg. Fentanyl given with improvement. Patient started on 

heparin.  Repeat EKG is unchanged. Case reviewed discussed in detail with Dr. Hsu who will see this morning with anticipated heart catheterization. 

Ochoa to admit. ]





Dragon Disclaimer


Dragon Disclaimer


This electronic medical record was generated, in whole or in part, using a 

voice recognition dictation system.





Departure


Departure


Impression:  


 Primary Impression:  


 Acute coronary syndrome


Disposition:  09 ADMITTED AS INPATIENT


Admitting Physician:  Lisy Ochoa


Condition:  IMPROVED


Referrals:  


LISY OCHOA MD (PCP)











GINGER WINSTON DO Nov 30, 2017 04:37

## 2017-11-30 NOTE — EKG
[facility]

              [address] [place] [address]

Test Date:    2017               Test Time:    03:46:21

Pat Name:     TUNG DIEHL             Department:   

Patient ID:   [id number]           Room:          

Gender:       F                        Technician:   

:          [id number]               Requested By: GINGER WINSTON

Order Number: [id number]            Reading MD:     

                                 Measurements

Intervals                              Axis          

Rate:         75                       P:            21

CA:           150                      QRS:          -13

QRSD:         92                       T:            -7

QT:           352                                    

QTc:          396                                    

                           Interpretive Statements

SINUS RHYTHM

LEFTWARD AXIS

T ABNORMALITY IN ANTERIOR LEADS

ABNORMAL ECG

RI6.01

No previous ECG available for comparison

## 2017-12-01 NOTE — PDOC3
IM DISCHARGE SUMMARY


Date of Admission


Date of Admission


Date of Admission:  Nov 30, 2017 at 04:48





Date of Discharge


Date of Discharge


11/30/17





Primary Diagnosis


Primary Diagnosis


1. chest pain with palpitation- non cardiac - possible autoimmune will require 

OP w/u 


2. hypertensive urgency- resolved 


3. hypokalemia -resolved 


4. anemia CD


5. GERD


6. Redding's esophagus 


7 autoimmune disorder 


8. gastric ulcers x 2


Problems:  





Consults


Consults


Elias Hsu MD





Procedures


Procedures


--------------- APPROVED REPORT --------------








Test Type:                Pharmacological                  


Stress Nurse/Tech:       sherrell diaz                  


Test Indications:       CHEST PAIN                  


Cardiac History:       NONE STATED, SEE EHR               


Medications:           SEE EHR                     


Medical History:       NONE STATED, SEE EHR               


Resting ECG:           SR WITH T WAVE INVERSION            


Resting Heart Rate:       71 bpm                     


Resting Blood Pressure:    122/78mmHg                  


Pretest Chest Pain:       No chest pain                  





Nurse/Tech Notes


NO RESPIRATORY DISTRESS AND NO CHEST PAIN AT THIS TIME.


Consent: The procedure was explained to the patient in lay terms. Informed 

consent was witnessed. Timeout was entered into [employer]. History and Stress 

Test performed by MARIA LUISA Ramires, STAN (R) (N)





Pharm. Details


Pharmacologic stress testing was performed using 0.4mg per 5ml of regadenoson 

given intravenously over 7-10 seconds.





Stress Symptoms


BODY FATIGUE, STOMACH, AND HEADACHE.





POST EXERCISE


Reason for Termination: Infusion complete                     


Max HR: 116 bpm                                 


Max Blood Pressure: 126/82mmHg                           


Chest Pain: No.                               


Arrhythmia: No.                               


ST Change: No.                                  





INTERPRETATION


Stress EKG Conclusion: Baseline EKG showed sinus rhythm.  No ischemic changes 

at peak stress.  No arrhythmias.





Imaging Protocol


IMAGE PROTOCOL: Rest Tc-99m/stress Tc-99m 1 day





      Rest:               Stress:            Viability:            


Radiopharm.   Tc99m Sestamibi      Tc99m Sestamibi                  


Dose      9.5mCi               37mCi                           


Duration       15min.              10min.                          


Img Date     11/30/2017 11/30/2017                     


Inj-Img Time   60min.              60min.                          





Rest Admin Site:   IV - Left Antecubital   :   RT Leann (R)(N

)         


Stress Admin Site:    IV - Left Antecubital   :    MARIA LUISA Ramires, STAN (R)(N)   





STRESS DATA


End Diast. Vol.   97.0ml   Av. Heart Rate   74.0bpm


End Syst. Vol.   28.0ml   CO Index BSA   0.0L/min


Myocardial Mass   141.0g   Eject. Fraction   71.0%





Stress Rates


Pk. Fill Rate   2.98EDV/sec   LVtime Pk. Fill    111.52msec


Pk. Empty Rate   4.26ESV/sec   LVtime Pk. Eject   135.46msec


1/3 Pk. Fill   2.07EDV/sec            





Stress Scores


Regional WT   0.00   Summed WT   1.00


Regional WM   0.00   Summed WM   0.00





Study quality was good.  Left Ventricular size was Normal at Rest and Stress.


Lung uptake was Normal.  Left Ventricular ejection fraction is 71%.


The rest and stress images show normal perfusion, normal contraction and 

thickening.





LV Perf. Quant


17 Seg. SSS         0.00                              


17 Seg. SRS         0.00                              


17 Seg. SDS         0.00                              


Stress Defect Extent (% LAD)   0.00   Rest Defect Extent (% LAD)   0.60   Rev. 

Defect Extent (% LAD)   0.00


Stress Defect Extent (% LCX)    0.00   Rest Defect Extent (% LCX)   0.00   Rev. 

Defect Extent (% LCX)   0.00


Stress Defect Extent (% RCA)   0.00   Rest Defect Extent (% RCA)   0.00   Rev. 

Defect Extent (% RCA)   0.00


Stress Defect Extent (% SEE)   0.00   Rest Defect Extent (% SEE)   0.70   Rev. 

Defect Extent (% SEE)   0.00





Conclusion


1. Regadenoson cardioisotope stress test did not show any evidence of ischemia 

or infarct.


2. Normal left ventricular systolic function with ejection fraction calculated 

at 71%.


3. Low risk for cardiac events.


DICTATED and SIGNED BY:     MARQUEZ GARCIA MD


DATE:     11/30/17 4588





Labs


Labs





Laboratory Tests








Test


  11/30/17


03:50 11/30/17


04:20 11/30/17


04:27 11/30/17


08:30


 


White Blood Count


  6.0 x10^3/uL


(4.0-11.0) 


  


  


 


 


Red Blood Count


  4.20 x10^6/uL


(3.50-5.40) 


  


  


 


 


Hemoglobin


  10.2 g/dL


(12.0-15.5) 


  


  


 


 


Hematocrit


  32.3 %


(36.0-47.0) 


  


  


 


 


Mean Corpuscular Volume 77 fL ()    


 


Mean Corpuscular Hemoglobin 24 pg (25-35)    


 


Mean Corpuscular Hemoglobin


Concent 32 g/dL


(31-37) 


  


  


 


 


Red Cell Distribution Width


  17.2 %


(11.5-14.5) 


  


  


 


 


Platelet Count


  358 x10^3/uL


(140-400) 


  


  


 


 


Neutrophils (%) (Auto) 33 % (31-73)    


 


Lymphocytes (%) (Auto) 58 % (24-48)    


 


Monocytes (%) (Auto) 6 % (0-9)    


 


Eosinophils (%) (Auto) 3 % (0-3)    


 


Basophils (%) (Auto) 1 % (0-3)    


 


Neutrophils # (Auto)


  2.0 x10^3uL


(1.8-7.7) 


  


  


 


 


Lymphocytes # (Auto)


  3.5 x10^3/uL


(1.0-4.8) 


  


  


 


 


Monocytes # (Auto)


  0.4 x10^3/uL


(0.0-1.1) 


  


  


 


 


Eosinophils # (Auto)


  0.2 x10^3/uL


(0.0-0.7) 


  


  


 


 


Basophils # (Auto)


  0.0 x10^3/uL


(0.0-0.2) 


  


  


 


 


Sodium Level


  142 mmol/L


(136-145) 


  


  143 mmol/L


(136-145)


 


Potassium Level


  3.4 mmol/L


(3.5-5.1) 


  


  3.7 mmol/L


(3.5-5.1)


 


Chloride Level


  106 mmol/L


() 


  


  107 mmol/L


()


 


Carbon Dioxide Level


  28 mmol/L


(21-32) 


  


  28 mmol/L


(21-32)


 


Anion Gap 8 (6-14)    8 (6-14) 


 


Blood Urea Nitrogen 8 mg/dL (7-20)    9 mg/dL (7-20) 


 


Creatinine


  0.8 mg/dL


(0.6-1.0) 


  


  0.7 mg/dL


(0.6-1.0)


 


Estimated GFR


(Cockcroft-Gault) 95.6 


  


  


  111.6 


 


 


BUN/Creatinine Ratio 10 (6-20)    


 


Glucose Level


  98 mg/dL


(70-99) 


  


  93 mg/dL


(70-99)


 


Calcium Level


  8.7 mg/dL


(8.5-10.1) 


  


  8.5 mg/dL


(8.5-10.1)


 


Iron Level


  50 ug/dL


() 


  


  


 


 


Total Iron Binding Capacity


  342 ug/dL


(250-450) 


  


  


 


 


Iron Saturation 15 % (15-34)    


 


Total Bilirubin


  0.4 mg/dL


(0.2-1.0) 


  


  


 


 


Aspartate Amino Transf


(AST/SGOT) 17 U/L (15-37) 


  


  


  


 


 


Alanine Aminotransferase


(ALT/SGPT) 20 U/L (14-59) 


  


  


  


 


 


Alkaline Phosphatase


  78 U/L


() 


  


  


 


 


Troponin I Quantitative


  < 0.017 ng/mL


(0.000-0.055) 


  


  < 0.017 ng/mL


(0.000-0.055)


 


NT-Pro-B-Type Natriuretic


Peptide 108 pg/mL


(0-124) 


  


  


 


 


Total Protein


  8.4 g/dL


(6.4-8.2) 


  


  


 


 


Albumin


  3.5 g/dL


(3.4-5.0) 


  


  


 


 


Albumin/Globulin Ratio 0.7 (1.0-1.7)    


 


Vitamin B12 Level


  449 pg/mL


(247-911) 


  


  


 


 


Serum Folate


  14.12 ng/ml


(3.2-20.0) 


  


  


 


 


Serum Pregnancy Test,


Qualitative Negative (NEG) 


  


  


  


 


 


Urine Opiates Screen  Neg (NEG)   


 


Urine Methadone Screen  Neg (NEG)   


 


Urine Barbiturates  Neg (NEG)   


 


Urine Phencyclidine Screen  Neg (NEG)   


 


Urine


Amphetamine/Methamphetamine 


  Neg (NEG) 


  


  


 


 


Urine Benzodiazepines Screen  Neg (NEG)   


 


Urine Cocaine Screen  Neg (NEG)   


 


Urine Cannabinoids Screen  Neg (NEG)   


 


Urine Ethyl Alcohol  Neg (NEG)   


 


Bedside Urine HCG, Qualitative


  


  


  Hcg negative


(Negative) 


 


 


Triglycerides Level


  


  


  


  50 mg/dL


(0-150)


 


Cholesterol Level


  


  


  


  140 mg/dL


(0-200)


 


LDL Cholesterol, Calculated


  


  


  


  79 mg/dL


(0-100)


 


VLDL Cholesterol, Calculated


  


  


  


  10 mg/dL


(0-40)


 


Non-HDL Cholesterol Calculated


  


  


  


  89 mg/dL


(0-129)


 


HDL Cholesterol


  


  


  


  51 mg/dL


(40-60)


 


Cholesterol/HDL Ratio    2.7 


 


Thyroid Stimulating Hormone


(TSH) 


  


  


  0.497 uIU/mL


(0.358-3.74)


 


Test


  11/30/17


11:40 11/30/17


14:30 


  


 


 


Prothrombin Time


  13.1 SEC


(11.7-14.0) 


  


  


 


 


Prothromb Time International


Ratio 1.1 (0.8-1.1) 


  


  


  


 


 


Heparin Anti-Xa Act,


Unfractionated 0.44 IU/mL


(0.30-0.70) 


  


  


 


 


Troponin I Quantitative


  < 0.017 ng/mL


(0.000-0.055) < 0.017 ng/mL


(0.000-0.055) 


  


 











Brief hospital course


Brief hospital course


This 42  year old  female who presented with chest pain was 

admitted. The following is a summary of her treatment: 


PLAN:


11/30/17


chest pain - Troponin negative - NTG x 2 not relieved this morning - Fentanyl 

given and mild relief - EKG SR - K 3.4 - cardiology consulted - ASA 324mg ED - 

possible CC today - lipids controlled - MPI negative-


                    non cardiac - eval outpatient as there is autoimmune 

disorder 


hypertensive urgency - r/t chest pain - improved with pain control - 


hypokalemia - K 3.4 - recheck this morning


anemia - Admit Hmg  10.2 - check Fe B12 Folate levels - monitor - PENIDING_ F/U 

OP


numbness paresthesia FA - malignant HTN - neg neck/thoracic pain/tenderness - 

check CT head w/o contrast -CT head negative





Dr. Larry note:


Patient has chest pain radiating to rt upper extremity with numbness from elbow 

to hand.No weakness.CNS exam unremarkable.


Peripheral pulse ok.No carotid bruit.


No GI symptoms.


Cardiac workup.


The patient was seen and examined by me. Chart reviewed and plan of care 

formulated. Discussed with, reviewed and agree with ARNP's notes, plan of care 

and orders with modifications as necessary.


For more details regarding further plans, please refer to the orders.





For more details regarding the past history, family history, social history, 

surgical history and other details, please refer to History and Physical.


Medications


Medications reviewed and reconciled for discharge.


Allergy





 Allergies








Coded Allergies Type Severity Reaction Last Updated Verified


 


  No Known Drug Allergies    4/18/17 No








Follow up


in 3-5 days.


DISPOSITION:  Home


Comments


Discharge Management - 35 minutes.


For other details please refer to discharge instructions











HOLLI GUTIÉRREZ Dec 1, 2017 07:27

## 2018-04-19 ENCOUNTER — HOSPITAL ENCOUNTER (OUTPATIENT)
Dept: HOSPITAL 61 - LAB | Age: 43
Discharge: HOME | End: 2018-04-19
Attending: INTERNAL MEDICINE
Payer: COMMERCIAL

## 2018-04-19 DIAGNOSIS — R21: Primary | ICD-10-CM

## 2018-04-19 DIAGNOSIS — I10: ICD-10-CM

## 2018-04-19 LAB
ADD MAN DIFF?: NO
ALBUMIN SERPL-MCNC: 3.6 G/DL (ref 3.4–5)
ALBUMIN/GLOB SERPL: 0.6 {RATIO} (ref 1–1.7)
ALP SERPL-CCNC: 88 U/L (ref 46–116)
ALT (SGPT): 18 U/L (ref 14–59)
ANION GAP SERPL CALC-SCNC: 10 MMOL/L (ref 6–14)
AST SERPL-CCNC: 16 U/L (ref 15–37)
BASO #: 0 X10^3/UL (ref 0–0.2)
BASO %: 0 % (ref 0–3)
BLOOD UREA NITROGEN: 18 MG/DL (ref 7–20)
BUN/CREAT SERPL: 20 (ref 6–20)
CALCIUM: 9 MG/DL (ref 8.5–10.1)
CHLORIDE: 105 MMOL/L (ref 98–107)
CO2 SERPL-SCNC: 27 MMOL/L (ref 21–32)
CREAT SERPL-MCNC: 0.9 MG/DL (ref 0.6–1)
EOS #: 0.2 X10^3/UL (ref 0–0.7)
EOS %: 3 % (ref 0–3)
GFR SERPLBLD BASED ON 1.73 SQ M-ARVRAT: 83.5 ML/MIN
GLOBULIN SER-MCNC: 5.8 G/DL (ref 2.2–3.8)
GLUCOSE SERPL-MCNC: 95 MG/DL (ref 70–99)
HCG SERPL-ACNC: 5 X10^3/UL (ref 4–11)
HEMATOCRIT: 35.3 % (ref 36–47)
HEMOGLOBIN: 11.3 G/DL (ref 12–15.5)
LYMPH #: 1.7 X10^3/UL (ref 1–4.8)
LYMPH %: 33 % (ref 24–48)
MEAN CORPUSCULAR HEMOGLOBIN: 25 PG (ref 25–35)
MEAN CORPUSCULAR HGB CONC: 32 G/DL (ref 31–37)
MEAN CORPUSCULAR VOLUME: 78 FL (ref 79–100)
MONO #: 0.4 X10^3/UL (ref 0–1.1)
MONO %: 8 % (ref 0–9)
NEUT #: 2.8 X10^3UL (ref 1.8–7.7)
NEUT %: 56 % (ref 31–73)
PLATELET COUNT: 333 X10^3/UL (ref 140–400)
POTASSIUM SERPL-SCNC: 3.8 MMOL/L (ref 3.5–5.1)
RED BLOOD COUNT: 4.53 X10^6/UL (ref 3.5–5.4)
RED CELL DISTRIBUTION WIDTH: 18.3 % (ref 11.5–14.5)
SODIUM: 142 MMOL/L (ref 136–145)
TOTAL BILIRUBIN: 0.4 MG/DL (ref 0.2–1)
TOTAL PROTEIN: 9.4 G/DL (ref 6.4–8.2)

## 2018-04-19 PROCEDURE — 85025 COMPLETE CBC W/AUTO DIFF WBC: CPT

## 2018-04-19 PROCEDURE — 36415 COLL VENOUS BLD VENIPUNCTURE: CPT

## 2018-04-19 PROCEDURE — 80053 COMPREHEN METABOLIC PANEL: CPT

## 2019-07-25 ENCOUNTER — HOSPITAL ENCOUNTER (OUTPATIENT)
Dept: HOSPITAL 61 - KCIC | Age: 44
Discharge: HOME | End: 2019-07-25
Attending: INTERNAL MEDICINE
Payer: COMMERCIAL

## 2019-07-25 DIAGNOSIS — M77.51: Primary | ICD-10-CM

## 2019-07-25 PROCEDURE — 73630 X-RAY EXAM OF FOOT: CPT

## 2019-07-25 PROCEDURE — 73610 X-RAY EXAM OF ANKLE: CPT

## 2019-07-25 NOTE — KCIC
EXAM: 

AP, oblique and lateral views of the right ankle

AP, oblique and lateral views of the right foot

 

DATE: 7/25/2019 12:00 AM

 

INDICATION: Fall, right foot and ankle pain laterally

 

COMPARISON: No Prior

 

FINDINGS:

No evidence of acute fracture or dislocation. Ankle mortise is congruent. 

Talar dome is intact. Calcaneal enthesopathy. Tarsometatarsal alignment is

normal without offset. Joint spaces are preserved without significant 

degenerative/proliferative change.

 

IMPRESSION:

No evidence for acute fracture or dislocation of the right foot or ankle.

 

Electronically signed by: Inocente Raymundo MD (7/25/2019 3:01 PM) [facility]

## 2019-07-25 NOTE — KCIC
EXAM: 

AP, oblique and lateral views of the right ankle

AP, oblique and lateral views of the right foot

 

DATE: 7/25/2019 12:00 AM

 

INDICATION: Fall, right foot and ankle pain laterally

 

COMPARISON: No Prior

 

FINDINGS:

No evidence of acute fracture or dislocation. Ankle mortise is congruent. 

Talar dome is intact. Calcaneal enthesopathy. Tarsometatarsal alignment is

normal without offset. Joint spaces are preserved without significant 

degenerative/proliferative change.

 

IMPRESSION:

No evidence for acute fracture or dislocation of the right foot or ankle.

 

Electronically signed by: Inocente Raymundo MD (7/25/2019 3:01 PM) [facility]

## 2020-07-16 ENCOUNTER — HOSPITAL ENCOUNTER (OUTPATIENT)
Dept: HOSPITAL 61 - LAB | Age: 45
Discharge: HOME | End: 2020-07-16
Attending: INTERNAL MEDICINE
Payer: COMMERCIAL

## 2020-07-16 DIAGNOSIS — Z20.828: Primary | ICD-10-CM
